# Patient Record
Sex: MALE | URBAN - METROPOLITAN AREA
[De-identification: names, ages, dates, MRNs, and addresses within clinical notes are randomized per-mention and may not be internally consistent; named-entity substitution may affect disease eponyms.]

---

## 2022-07-09 ENCOUNTER — OFFICE VISIT (OUTPATIENT)
Dept: URBAN - METROPOLITAN AREA CLINIC 45 | Facility: CLINIC | Age: 57
End: 2022-07-09
Payer: COMMERCIAL

## 2022-07-09 DIAGNOSIS — H40.033 ANATOMICAL NARROW ANGLE, BILATERAL: ICD-10-CM

## 2022-07-09 DIAGNOSIS — H52.4 PRESBYOPIA: ICD-10-CM

## 2022-07-09 DIAGNOSIS — H25.13 AGE-RELATED NUCLEAR CATARACT, BILATERAL: ICD-10-CM

## 2022-07-09 DIAGNOSIS — H01.02A SQUAMOUS BLEPHARITIS RIGHT EYE, UPPER AND LOWER EYELIDS: ICD-10-CM

## 2022-07-09 DIAGNOSIS — H01.02B SQUAMOUS BLEPHARITIS LEFT EYE, UPPER AND LOWER EYELIDS: Primary | ICD-10-CM

## 2022-07-09 PROCEDURE — 99203 OFFICE O/P NEW LOW 30 MIN: CPT | Performed by: OPTOMETRIST

## 2022-07-09 PROCEDURE — 92020 GONIOSCOPY: CPT | Performed by: OPTOMETRIST

## 2022-07-09 ASSESSMENT — VISUAL ACUITY
OS: 20/20
OD: 20/20

## 2022-07-09 ASSESSMENT — INTRAOCULAR PRESSURE
OD: 12
OS: 12

## 2022-07-09 NOTE — IMPRESSION/PLAN
Impression: Anatomical narrow angle, bilateral: H40.033. Plan: Educated patient on condition and risk of occlusion. YAG PI not indicated at this time. Instructed patient to RTC if she notices sudden eye pain, decreased vision, or light sensitivity. Monitor. Preformed Gonio today OU.